# Patient Record
Sex: FEMALE | Race: WHITE | NOT HISPANIC OR LATINO | URBAN - METROPOLITAN AREA
[De-identification: names, ages, dates, MRNs, and addresses within clinical notes are randomized per-mention and may not be internally consistent; named-entity substitution may affect disease eponyms.]

---

## 2024-06-08 ENCOUNTER — EMERGENCY (EMERGENCY)
Facility: HOSPITAL | Age: 54
LOS: 0 days | Discharge: HOME | End: 2024-06-08
Attending: EMERGENCY MEDICINE
Payer: COMMERCIAL

## 2024-06-08 VITALS
RESPIRATION RATE: 18 BRPM | HEART RATE: 71 BPM | DIASTOLIC BLOOD PRESSURE: 82 MMHG | HEIGHT: 66 IN | OXYGEN SATURATION: 99 % | SYSTOLIC BLOOD PRESSURE: 140 MMHG | WEIGHT: 128.09 LBS | TEMPERATURE: 98 F

## 2024-06-08 DIAGNOSIS — S61.210A LACERATION WITHOUT FOREIGN BODY OF RIGHT INDEX FINGER WITHOUT DAMAGE TO NAIL, INITIAL ENCOUNTER: ICD-10-CM
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
## 2024-06-08 DIAGNOSIS — Z23 ENCOUNTER FOR IMMUNIZATION: ICD-10-CM

## 2024-06-08 PROCEDURE — 12001 RPR S/N/AX/GEN/TRNK 2.5CM/<: CPT

## 2024-06-08 PROCEDURE — 99283 EMERGENCY DEPT VISIT LOW MDM: CPT | Mod: 25

## 2024-06-08 PROCEDURE — 90471 IMMUNIZATION ADMIN: CPT

## 2024-06-08 PROCEDURE — 99284 EMERGENCY DEPT VISIT MOD MDM: CPT | Mod: 25

## 2024-06-08 PROCEDURE — 90715 TDAP VACCINE 7 YRS/> IM: CPT

## 2024-06-08 RX ORDER — TETANUS TOXOID, REDUCED DIPHTHERIA TOXOID AND ACELLULAR PERTUSSIS VACCINE, ADSORBED 5; 2.5; 8; 8; 2.5 [IU]/.5ML; [IU]/.5ML; UG/.5ML; UG/.5ML; UG/.5ML
0.5 SUSPENSION INTRAMUSCULAR ONCE
Refills: 0 | Status: COMPLETED | OUTPATIENT
Start: 2024-06-08 | End: 2024-06-08

## 2024-06-08 RX ADMIN — TETANUS TOXOID, REDUCED DIPHTHERIA TOXOID AND ACELLULAR PERTUSSIS VACCINE, ADSORBED 0.5 MILLILITER(S): 5; 2.5; 8; 8; 2.5 SUSPENSION INTRAMUSCULAR at 13:45

## 2024-06-08 NOTE — ED PROVIDER NOTE - ATTENDING APP SHARED VISIT CONTRIBUTION OF CARE
I have personally performed a history and physical exam on this patient and personally directed the management of the patient. Patient is a 64-year-old female presents for evaluation of right second digit laceration after cutting hedges with a hedge tremor caught with a right hand denies any fevers chills hemostasis is achieved at this point patient has no other trauma    On physical exam patient is well-appearing normocephalic atraumatic pupils equally round react light accommodation patient has a 1.5 cm laceration to the right second digit no evidence of weakness no evidence to suggest tendon injury no signs of ascending infection radial pulse 2+ capillary refills normal patient also has a skin tear over the right third digit not require wound repair otherwise able to ambulate well    Assessment plan patient presents for laceration to finger no weakness no signs of infection no signs of tendon injury no signs of vascular compromise she successfully underwent wound repair I will discharge at this time follow-up to hand we discussed indications to return patient is aware

## 2024-06-08 NOTE — ED PROVIDER NOTE - NSFOLLOWUPINSTRUCTIONS_ED_ALL_ED_FT
Follow-up in emergency department in 7-10 days for suture removal.     Laceration    A laceration is a cut that goes through all of the layers of the skin and into the tissue that is right under the skin. Some lacerations heal on their own. Others need to be closed with stitches (sutures), staples, skin adhesive strips, or skin glue. Proper laceration care minimizes the risk of infection and helps the laceration to heal better.     SEEK IMMEDIATE MEDICAL CARE IF YOU HAVE THE FOLLOWING SYMPTOMS: swelling around the wound, worsening pain, drainage from the wound, red streaking going away from your wound, inability to move finger or toe near the laceration, or discoloration of skin near the laceration.

## 2024-06-08 NOTE — ED PROVIDER NOTE - PATIENT PORTAL LINK FT
You can access the FollowMyHealth Patient Portal offered by [facility] by registering at the following website: [email or web address]. By joining [facility]’s FollowMyHealth portal, you will also be able to view your health information using other applications (apps) compatible with our system.

## 2024-06-08 NOTE — ED PROVIDER NOTE - PHYSICAL EXAMINATION
Vital Signs: I have reviewed the initial vital signs.  Constitutional: appears stated age, no acute distress  Eyes: Sclera clear, EOMI.  Cardiovascular: S1 and S2, regular rate, regular rhythm, well-perfused extremities, radial pulses equal and 2+  Respiratory: unlabored respiratory effort, clear to auscultation bilaterally no wheezing, rales, or rhonchi  Musculoskeletal: supple neck, no lower extremity edema  Integumentary: warm, dry, 1.5 cm laceration to right 2nd digit, no tendinous involvement. additional skin tear over proximal right 3rd digit and right hand palm that are nonbleeding with no tendinous involvement. full range of motion in all directions 5/5 strength and sensation intact to right 1st/2nd/3rd MCP, PIP, and DIP  Neurologic: awake, alert, oriented x3, extremities’ motor and sensory functions grossly intact

## 2024-06-08 NOTE — ED PROVIDER NOTE - CLINICAL SUMMARY MEDICAL DECISION MAKING FREE TEXT BOX
Patient is a 64-year-old female presents for evaluation of right second digit laceration after cutting hedges with a hedge tremor caught with a right hand denies any fevers chills hemostasis is achieved at this point patient has no other trauma    On physical exam patient is well-appearing normocephalic atraumatic pupils equally round react light accommodation patient has a 1.5 cm laceration to the right second digit no evidence of weakness no evidence to suggest tendon injury no signs of ascending infection radial pulse 2+ capillary refills normal patient also has a skin tear over the right third digit not require wound repair otherwise able to ambulate well    Assessment plan patient presents for laceration to finger no weakness no signs of infection no signs of tendon injury no signs of vascular compromise she successfully underwent wound repair I will discharge at this time follow-up to hand we discussed indications to return patient is aware

## 2024-06-08 NOTE — ED PROVIDER NOTE - OBJECTIVE STATEMENT
53-year-old female presents with complaint of right second digit laceration.  Patient reports she is left-handed.  States she was cutting hedges with a  when the issac fell out of her hand and she caught it with her right hand by reflex.  Endorses bleeding laceration to right second digit over right second middle phalanx.  Denies other injury/trauma.  States he is unsure of last tetanus shot.

## 2024-06-08 NOTE — ED ADULT NURSE NOTE - NSFALLUNIVINTERV_ED_ALL_ED
Bed/Stretcher in lowest position, wheels locked, appropriate side rails in place/Call bell, personal items and telephone in reach/Instruct patient to call for assistance before getting out of bed/chair/stretcher/Non-slip footwear applied when patient is off stretcher/[place] to call system/Physically safe environment - no spills, clutter or unnecessary equipment/Purposeful proactive rounding/Room/bathroom lighting operational, light cord in reach